# Patient Record
Sex: MALE | Race: BLACK OR AFRICAN AMERICAN | NOT HISPANIC OR LATINO | Employment: PART TIME | URBAN - METROPOLITAN AREA
[De-identification: names, ages, dates, MRNs, and addresses within clinical notes are randomized per-mention and may not be internally consistent; named-entity substitution may affect disease eponyms.]

---

## 2023-04-07 ENCOUNTER — APPOINTMENT (OUTPATIENT)
Dept: GENERAL RADIOLOGY | Facility: HOSPITAL | Age: 18
End: 2023-04-07
Payer: COMMERCIAL

## 2023-04-07 ENCOUNTER — HOSPITAL ENCOUNTER (EMERGENCY)
Facility: HOSPITAL | Age: 18
Discharge: HOME | End: 2023-04-07
Attending: EMERGENCY MEDICINE | Admitting: EMERGENCY MEDICINE
Payer: COMMERCIAL

## 2023-04-07 VITALS
RESPIRATION RATE: 16 BRPM | OXYGEN SATURATION: 99 % | DIASTOLIC BLOOD PRESSURE: 76 MMHG | HEIGHT: 71 IN | SYSTOLIC BLOOD PRESSURE: 108 MMHG | WEIGHT: 195 LBS | BODY MASS INDEX: 27.3 KG/M2 | HEART RATE: 72 BPM | TEMPERATURE: 98.4 F

## 2023-04-07 DIAGNOSIS — S93.492A SPRAIN OF ANTERIOR TALOFIBULAR LIGAMENT OF LEFT ANKLE, INITIAL ENCOUNTER: Primary | ICD-10-CM

## 2023-04-07 PROCEDURE — 99284 EMERGENCY DEPT VISIT MOD MDM: CPT

## 2023-04-07 PROCEDURE — 99283 EMERGENCY DEPT VISIT LOW MDM: CPT

## 2023-04-07 PROCEDURE — 73610 X-RAY EXAM OF ANKLE: CPT

## 2023-04-07 RX ORDER — IBUPROFEN 800 MG/1
800 TABLET ORAL EVERY 8 HOURS PRN
Qty: 30 TABLET | Refills: 0 | Status: SHIPPED | OUTPATIENT
Start: 2023-04-07

## 2023-04-07 RX ORDER — HYDROCODONE BITARTRATE AND ACETAMINOPHEN 7.5; 325 MG/1; MG/1
1 TABLET ORAL ONCE
Status: COMPLETED | OUTPATIENT
Start: 2023-04-07 | End: 2023-04-07

## 2023-04-07 RX ADMIN — HYDROCODONE BITARTRATE AND ACETAMINOPHEN 1 TABLET: 7.5; 325 TABLET ORAL at 21:59

## 2023-04-08 NOTE — ED PROVIDER NOTES
" EMERGENCY DEPARTMENT ENCOUNTER    Room Number:  24/24  Date seen:  4/8/2023  PCP: Shaggy Rendon MD  Historian: Patient  Relevant information provided by sources other than the patient, review of external records, and social determinants of health may be included in the HPI section.      HPI:  Chief Complaint: Left ankle injury  Additional historical features obtained directly from: No one  Context: Paddy Murphy is a 18 y.o. male who presents to the ED c/o inversion injury to the left ankle while playing basketball today.  Patient said he stepped \"on some big dude's foot\" and his foot turned under.  He has severe pain, mainly on the lateral aspect of the ankle along with swelling and is unable to bear weight.        Initial impression including social determinants which will impact the assessment isolated injury of the left ankle, will need to get plain films to determine if this is fracture or just sprained          PAST MEDICAL HISTORY  Active Ambulatory Problems     Diagnosis Date Noted   • No Active Ambulatory Problems     Resolved Ambulatory Problems     Diagnosis Date Noted   • No Resolved Ambulatory Problems     No Additional Past Medical History         PAST SURGICAL HISTORY  History reviewed. No pertinent surgical history.      FAMILY HISTORY  History reviewed. No pertinent family history.      SOCIAL HISTORY  Social History     Socioeconomic History   • Marital status: Single         ALLERGIES  Patient has no known allergies.          PHYSICAL EXAM  ED Triage Vitals [04/07/23 2123]   Temp Heart Rate Resp BP SpO2   98.4 °F (36.9 °C) 73 16 -- 99 %      Temp src Heart Rate Source Patient Position BP Location FiO2 (%)   Tympanic Monitor -- -- --         Physical Exam      GENERAL: Awake and alert, appears uncomfortable  HENT: nares patent  EYES: no scleral icterus, PERRL, EOMI  CV: regular rhythm, normal rate, distal pulses symmetric and palpable  RESPIRATORY: normal effort  ABDOMEN: soft, " "nondistended nontender with normal bowel sound  MUSCULOSKELETAL: no deformity.  There are some moderate soft tissue swelling over the lateral malleolus and just inferior and anterior to that.  No instability, it is tender but neurovascular intact without deformity  NEURO: alert, moves all extremities, follows commands  PSYCH:  calm, cooperative  SKIN: warm, dry with no rash        LAB RESULTS  No results found for this or any previous visit (from the past 24 hour(s)).    Ordered the above labs and my independent interpretation of these results are discussed in the section labeled \"ED course\" below        RADIOLOGY  XR Ankle 3+ View Left    Result Date: 4/8/2023  LEFT ANKLE  HISTORY: Inversion injury.  FINDINGS: 3 views of the left ankle demonstrate soft tissue prominence lateral to the lateral malleolus as well as anterior to the ankle. There is no evidence of an acute fracture. A calcific density is identified inferior to the lateral malleolus, well demarcated. This may relate to a remote fracture.  This report was finalized on 4/8/2023 1:04 PM by Dr. Carlos Hackett M.D.        Ordered the above noted radiological studies.  Independently interpreted by me in PACS.  My independent interpretation of these results are discussed in the section below labeled \"ED course\"        PROCEDURES  Procedures          MEDICATIONS GIVEN IN ER  Medications   HYDROcodone-acetaminophen (NORCO) 7.5-325 MG per tablet 1 tablet (1 tablet Oral Given 4/7/23 2159)                   MEDICAL DECISION MAKING and INDEPENDENT INTERPRETATIONS      Everything documented below this statement is the \"ED course\" section which I have referred to above.  Everything discussed in the following section constitutes MY INDEPENDENT INTERPRETATIONS of the lab work, EKGs, and radiology studies, and all of my personal conversations with other providers, and all of my independent decision making regarding this patient are discussed below.      ED Course as of " "04/08/23 2046   Sat Apr 08, 2023 2045 Plain film of the left ankle shows no acute fracture but there is some soft tissue swelling.    Patient was given crutches and a Cam walking boot, and he can follow-up in the outpatient setting [DP]      ED Course User Index  [DP] Bharathi Holman MD         Everything documented above with this statement, in the ED course section constitutes my independent interpretation of lab work EKG and radiology studies along with my personal conversations with other providers and my independent medical decision making.  This statement will not be repeated before each data point, but they are all my independent interpretation needs contained within the \"ED course\" section.             All appropriate hygiene and PPE requirements were satisfied with this patient encounter      FINAL DIAGNOSES  Final diagnoses:   Sprain of anterior talofibular ligament of left ankle, initial encounter           DISPOSITION  Discharge          Latest Documented Vital Signs:  As of 20:46 EDT  BP- 108/76 HR- 72 Temp- 98.4 °F (36.9 °C) (Tympanic) O2 sat- 99%        --    Please note that portions of this were completed with a voice recognition program.       Note Disclaimer: At [facility], we believe that sharing information builds trust and better relationships. You are receiving this note because you are receiving care at [facility] or recently visited. It is possible you will see health information before a provider has talked with you about it. This kind of information can be easy to misunderstand. To help you fully understand what it      Bharathi Holman MD  04/08/23 2046    "

## 2023-04-08 NOTE — ED TRIAGE NOTES
Pt to ER via pvt car, states he rolled his left ankle while playing basketball approx 1 hour pta.  C/O pain 8/10 at this time.      This RN in appropriate PPE for all patient care interactions. Pt masked and redirected for proper mask use when necessary. Hand hygiene performed before and after all patient care interactions.

## 2023-04-08 NOTE — DISCHARGE INSTRUCTIONS
Ice as frequently as possible, wear your boot for comfort and support when needed, but you may bear weight carefully as tolerated.  It is important that you take ibuprofen on a regular basis, and ice multiple times a day with elevation.  See your PCP in about a week for follow-up for further instruction